# Patient Record
Sex: FEMALE | Race: BLACK OR AFRICAN AMERICAN | NOT HISPANIC OR LATINO | URBAN - METROPOLITAN AREA
[De-identification: names, ages, dates, MRNs, and addresses within clinical notes are randomized per-mention and may not be internally consistent; named-entity substitution may affect disease eponyms.]

---

## 2020-05-08 PROBLEM — R74.01 TRANSAMINITIS: Status: ACTIVE | Noted: 2020-05-08

## 2020-05-08 PROBLEM — J80 ACUTE RESPIRATORY DISTRESS SYNDROME (ARDS) DUE TO COVID-19 VIRUS: Status: ACTIVE | Noted: 2020-05-08

## 2020-05-08 PROBLEM — U07.1 ACUTE RESPIRATORY DISTRESS SYNDROME (ARDS) DUE TO COVID-19 VIRUS: Status: ACTIVE | Noted: 2020-05-08

## 2020-05-08 PROBLEM — J06.9 ACUTE RESPIRATORY DISEASE DUE TO COVID-19 VIRUS: Status: ACTIVE | Noted: 2020-05-08

## 2020-05-08 PROBLEM — E66.01 MORBID OBESITY WITH BMI OF 50.0-59.9, ADULT: Status: ACTIVE | Noted: 2020-05-08

## 2020-05-08 PROBLEM — J96.01 ACUTE HYPOXEMIC RESPIRATORY FAILURE: Status: ACTIVE | Noted: 2020-05-08

## 2020-05-08 PROBLEM — M62.82 NON-TRAUMATIC RHABDOMYOLYSIS: Status: ACTIVE | Noted: 2020-05-08

## 2020-05-10 PROBLEM — E87.6 HYPOKALEMIA: Status: ACTIVE | Noted: 2020-05-10

## 2020-05-12 PROBLEM — R00.0 SINUS TACHYCARDIA: Status: ACTIVE | Noted: 2020-05-12

## 2020-05-12 PROBLEM — E87.6 HYPOKALEMIA: Status: RESOLVED | Noted: 2020-05-10 | Resolved: 2020-05-12

## 2020-05-14 PROBLEM — J98.2 PNEUMOMEDIASTINUM: Status: ACTIVE | Noted: 2020-05-14

## 2020-05-15 PROBLEM — I26.94 MULTIPLE SUBSEGMENTAL PULMONARY EMBOLI WITHOUT ACUTE COR PULMONALE: Status: ACTIVE | Noted: 2020-05-15

## 2020-05-20 ENCOUNTER — NURSE TRIAGE (OUTPATIENT)
Dept: ADMINISTRATIVE | Facility: CLINIC | Age: 25
End: 2020-05-20

## 2020-05-20 NOTE — TELEPHONE ENCOUNTER
Pt stated she drank Gatorade last night and water and this am she went to the bathroom this am she urinated a lot. Pt stated whatever she drinks, it comes right back out of her. Pt stated her cough is dry. She can feel the mucus but it don't come up. Pt stated her cough is like a croup cough real dry. Pt instructed to drink hot tea, soup, use a humidifier. Pt stated when she lean over to the rt side to stretch it feels like something is pulling and pain on the left side 7-8/10 and she has to take a real deep breath. Pt stated she feels a shaking sensations across the top of her chest. Pt stated she is not sure if its her anxiety. Pt stated whenever she breaths in and out she feel like she is gasping for air. Pt stated she is having severe difficulty breathing and is struggling for air now. Pt instructed to hang up now and call 911. Pt verbalized understanding.   Reason for Disposition   SEVERE difficulty breathing (e.g., struggling for each breath, speaks in single words)    Additional Information   Negative: Passed out (i.e., lost consciousness, collapsed and was not responding)   Negative: Shock suspected (e.g., cold/pale/clammy skin, too weak to stand, low BP, rapid pulse)   Negative: Sounds like a life-threatening emergency to the triager    Protocols used: CORONAVIRUS (COVID-19) DIAGNOSED OR [id number], FLANK PAIN-A-OH

## 2020-05-22 PROBLEM — R00.0 TACHYCARDIA: Status: ACTIVE | Noted: 2020-05-22

## 2020-05-22 PROBLEM — I10 ESSENTIAL HYPERTENSION: Status: ACTIVE | Noted: 2020-05-22

## 2020-05-23 PROBLEM — R19.5 DARK STOOLS: Status: ACTIVE | Noted: 2020-05-23

## 2020-05-23 PROBLEM — F06.4 ANXIETY DISORDER DUE TO GENERAL MEDICAL CONDITION WITH PANIC ATTACK: Status: ACTIVE | Noted: 2020-05-23

## 2020-05-23 PROBLEM — F41.0 ANXIETY DISORDER DUE TO GENERAL MEDICAL CONDITION WITH PANIC ATTACK: Status: ACTIVE | Noted: 2020-05-23

## 2020-07-11 PROBLEM — F41.1 GENERALIZED ANXIETY DISORDER: Status: ACTIVE | Noted: 2020-07-11

## 2020-08-09 PROBLEM — F06.4 ANXIETY DISORDER DUE TO GENERAL MEDICAL CONDITION WITH PANIC ATTACK: Status: RESOLVED | Noted: 2020-05-23 | Resolved: 2020-08-09

## 2020-08-09 PROBLEM — F41.0 ANXIETY DISORDER DUE TO GENERAL MEDICAL CONDITION WITH PANIC ATTACK: Status: RESOLVED | Noted: 2020-05-23 | Resolved: 2020-08-09

## 2022-07-12 ENCOUNTER — TELEPHONE (OUTPATIENT)
Dept: ADMINISTRATIVE | Facility: HOSPITAL | Age: 27
End: 2022-07-12